# Patient Record
Sex: FEMALE | Race: WHITE
[De-identification: names, ages, dates, MRNs, and addresses within clinical notes are randomized per-mention and may not be internally consistent; named-entity substitution may affect disease eponyms.]

---

## 2019-08-24 ENCOUNTER — HOSPITAL ENCOUNTER (INPATIENT)
Dept: HOSPITAL 61 - ER | Age: 24
LOS: 1 days | Discharge: HOME | DRG: 418 | End: 2019-08-25
Attending: FAMILY MEDICINE | Admitting: FAMILY MEDICINE
Payer: SELF-PAY

## 2019-08-24 VITALS — BODY MASS INDEX: 37.38 KG/M2 | WEIGHT: 267 LBS | HEIGHT: 71 IN

## 2019-08-24 DIAGNOSIS — K80.10: Primary | ICD-10-CM

## 2019-08-24 DIAGNOSIS — J45.909: ICD-10-CM

## 2019-08-24 DIAGNOSIS — E66.01: ICD-10-CM

## 2019-08-24 DIAGNOSIS — F98.8: ICD-10-CM

## 2019-08-24 DIAGNOSIS — F32.9: ICD-10-CM

## 2019-08-24 DIAGNOSIS — Z84.89: ICD-10-CM

## 2019-08-24 DIAGNOSIS — F17.210: ICD-10-CM

## 2019-08-24 DIAGNOSIS — N39.0: ICD-10-CM

## 2019-08-24 DIAGNOSIS — F41.9: ICD-10-CM

## 2019-08-24 DIAGNOSIS — I10: ICD-10-CM

## 2019-08-24 DIAGNOSIS — Z88.0: ICD-10-CM

## 2019-08-24 DIAGNOSIS — G40.909: ICD-10-CM

## 2019-08-24 LAB
ALBUMIN SERPL-MCNC: 3.9 G/DL (ref 3.4–5)
ALBUMIN/GLOB SERPL: 1 {RATIO} (ref 1–1.7)
ALP SERPL-CCNC: 75 U/L (ref 46–116)
ALT SERPL-CCNC: 14 U/L (ref 14–59)
AMORPH SED URNS QL MICRO: PRESENT /HPF
ANION GAP SERPL CALC-SCNC: 9 MMOL/L (ref 6–14)
APTT PPP: YELLOW S
AST SERPL-CCNC: 12 U/L (ref 15–37)
BACTERIA #/AREA URNS HPF: (no result) /HPF
BASOPHILS # BLD AUTO: 0.1 X10^3/UL (ref 0–0.2)
BASOPHILS NFR BLD: 1 % (ref 0–3)
BILIRUB SERPL-MCNC: 0.2 MG/DL (ref 0.2–1)
BILIRUB UR QL STRIP: NEGATIVE
BUN SERPL-MCNC: 16 MG/DL (ref 7–20)
BUN/CREAT SERPL: 18 (ref 6–20)
CALCIUM SERPL-MCNC: 9.3 MG/DL (ref 8.5–10.1)
CHLORIDE SERPL-SCNC: 105 MMOL/L (ref 98–107)
CO2 SERPL-SCNC: 27 MMOL/L (ref 21–32)
CREAT SERPL-MCNC: 0.9 MG/DL (ref 0.6–1)
EOSINOPHIL NFR BLD: 0.2 X10^3/UL (ref 0–0.7)
EOSINOPHIL NFR BLD: 3 % (ref 0–3)
ERYTHROCYTE [DISTWIDTH] IN BLOOD BY AUTOMATED COUNT: 13.9 % (ref 11.5–14.5)
FIBRINOGEN PPP-MCNC: (no result) MG/DL
GFR SERPLBLD BASED ON 1.73 SQ M-ARVRAT: 78.3 ML/MIN
GLOBULIN SER-MCNC: 3.9 G/DL (ref 2.2–3.8)
GLUCOSE SERPL-MCNC: 141 MG/DL (ref 70–99)
HCT VFR BLD CALC: 40.7 % (ref 36–47)
HGB BLD-MCNC: 13.7 G/DL (ref 12–15.5)
LIPASE: 129 U/L (ref 73–393)
LYMPHOCYTES # BLD: 2 X10^3/UL (ref 1–4.8)
LYMPHOCYTES NFR BLD AUTO: 22 % (ref 24–48)
MCH RBC QN AUTO: 30 PG (ref 25–35)
MCHC RBC AUTO-ENTMCNC: 34 G/DL (ref 31–37)
MCV RBC AUTO: 89 FL (ref 79–100)
MONO #: 0.6 X10^3/UL (ref 0–1.1)
MONOCYTES NFR BLD: 7 % (ref 0–9)
NEUT #: 6.3 X10^3/UL (ref 1.8–7.7)
NEUTROPHILS NFR BLD AUTO: 69 % (ref 31–73)
NITRITE UR QL STRIP: NEGATIVE
PH UR STRIP: 6.5 [PH]
PLATELET # BLD AUTO: 238 X10^3/UL (ref 140–400)
POTASSIUM SERPL-SCNC: 4 MMOL/L (ref 3.5–5.1)
PROT SERPL-MCNC: 7.8 G/DL (ref 6.4–8.2)
PROT UR STRIP-MCNC: NEGATIVE MG/DL
RBC # BLD AUTO: 4.6 X10^6/UL (ref 3.5–5.4)
RBC #/AREA URNS HPF: (no result) /HPF (ref 0–2)
SODIUM SERPL-SCNC: 141 MMOL/L (ref 136–145)
SQUAMOUS #/AREA URNS LPF: (no result) /LPF
T VAGINALIS URNS QL MICRO: PRESENT
UROBILINOGEN UR-MCNC: 0.2 MG/DL
WBC # BLD AUTO: 9.1 X10^3/UL (ref 4–11)
WBC #/AREA URNS HPF: (no result) /HPF (ref 0–4)

## 2019-08-24 PROCEDURE — 81025 URINE PREGNANCY TEST: CPT

## 2019-08-24 PROCEDURE — 74300 X-RAY BILE DUCTS/PANCREAS: CPT

## 2019-08-24 PROCEDURE — A7015 AEROSOL MASK USED W NEBULIZE: HCPCS

## 2019-08-24 PROCEDURE — 83690 ASSAY OF LIPASE: CPT

## 2019-08-24 PROCEDURE — 81001 URINALYSIS AUTO W/SCOPE: CPT

## 2019-08-24 PROCEDURE — G0378 HOSPITAL OBSERVATION PER HR: HCPCS

## 2019-08-24 PROCEDURE — 88304 TISSUE EXAM BY PATHOLOGIST: CPT

## 2019-08-24 PROCEDURE — 85025 COMPLETE CBC W/AUTO DIFF WBC: CPT

## 2019-08-24 PROCEDURE — 36415 COLL VENOUS BLD VENIPUNCTURE: CPT

## 2019-08-24 PROCEDURE — 87086 URINE CULTURE/COLONY COUNT: CPT

## 2019-08-24 PROCEDURE — 80053 COMPREHEN METABOLIC PANEL: CPT

## 2019-08-24 PROCEDURE — 76705 ECHO EXAM OF ABDOMEN: CPT

## 2019-08-24 RX ADMIN — MORPHINE SULFATE PRN MG: 4 INJECTION, SOLUTION INTRAMUSCULAR; INTRAVENOUS at 23:05

## 2019-08-24 NOTE — RAD
Examination: Ultrasound abdomen limited

 

HISTORY: History of right upper quadrant pain, fever

 

COMPARISON: 9/17/2015

 

FINDINGS:

 

The pancreas is not well-visualized. The echogenicity liver grossly 

demonstrates mild increased echogenicity. The common bile duct measures 6 

mm in diameter. The right lobe of the liver measures 18.3 cm.

 

The right kidney measures 11.3 cm in length.

 

Few echogenicities identified within the gallbladder likely 

cholelithiasis. Examination is positive for ultrasonographic evidence of 

Church's sign. The gallbladder wall thickness measures 3 mm. The IVC is 

within normal limits of dimension.

 

 

IMPRESSION:

 

1. Cholelithiasis. Examination is positive for ultrasonographic evidence 

of Church's sign. The gallbladder wall thickness is upper limits of 

normal. Cholecystitis is not completely excluded but no evidence of 

pericholecystic fluid identified. Correlate clinically.

 

2. Hepatic steatosis with mild hepatomegaly.

 

2. The common bile duct measures 6 mm in diameter is upper limits of 

normal. . Correlate with lab values.

 

Electronically signed by: Mane Bailey MD (8/24/2019 9:47 PM) [facility]

## 2019-08-24 NOTE — PHYS DOC
Past Medical History


Past Medical History:  Anemia, Anxiety, Asthma, Depression, Hypertension, 

Seizure


Past Surgical History:  Other


Additional Past Surgical Histo:  MOUTH AND BACK, RIGHT KNEE


Alcohol Use:  None


Drug Use:  None





Adult General


Chief Complaint


Chief Complaint:  ABDOMINAL PAIN





HPI


HPI





Patient is a 24  year old f p/w abdo pain


ruq since six am


fever 101


mom concerned about gb





dull constant sharp worsened with time radiates to flank


no prior surgeries


hx of epilepsy not on any meds





Review of Systems


Review of Systems





Constitutional: 


Eyes: Denies change in visual acuity, redness, or eye pain []


HENT: Denies nasal congestion or sore throat []


Respiratory: Denies cough or shortness of breath []


Cardiovascular: No additional information not addressed in HPI []


GI: 


Musculoskeletal: Denies back pain or joint pain []


Integument: Denies rash or skin lesions []


Neurologic: Denies headache, focal weakness or sensory changes []








All other systems were reviewed and found to be within normal limits, except as 

documented in this note.





Current Medications


Current Medications





Current Medications








 Medications


  (Trade)  Dose


 Ordered  Sig/Guille  Start Time


 Stop Time Status Last Admin


Dose Admin


 


 Ciprofloxacin/


 Dextrose  200 ml @ 


 200 mls/hr  1X  ONCE  8/24/19 22:15


 8/24/19 23:14   





 


 Fentanyl Citrate


  (Fentanyl 2ml


 Vial)  100 mcg  STK-MED ONCE  8/24/19 20:27


 8/24/19 20:28 DC  





 


 Metronidazole  100 ml @ 


 100 mls/hr  1X  ONCE  8/24/19 22:15


 8/24/19 23:14 UNV  





 


 Morphine Sulfate


  (Morphine


 Sulfate)  4 mg  PRN Q2HR  PRN  8/24/19 22:15


 8/25/19 22:14 UNV  





 


 Ondansetron HCl


  (Zofran)  4 mg  PRN Q8HRS  PRN  8/24/19 22:15


 8/25/19 22:14   





 


 Sodium Chloride  1,000 ml @ 


 100 mls/hr  Q10H  8/24/19 22:15


 8/25/19 22:14   














Allergies


Allergies





Allergies








Coded Allergies Type Severity Reaction Last Updated Verified


 


  Penicillins Allergy Severe  8/24/19 Yes











Physical Exam


Physical Exam





Constitutional: Well developed, well nourished, no acute distress, non-toxic 

appearance. []


HENT: Normocephalic, atraumatic, bilateral external ears normal, oropharynx 

moist, no oral exudates, nose normal. []


Eyes: PERRLA, EOMI, conjunctiva normal, no discharge. [] 


Neck: Normal range of motion, no tenderness, supple, no stridor. [] 


Cardiovascular:Heart rate regular rhythm, no murmur []


Lungs & Thorax:  Bilateral breath sounds clear to auscultation []


Abdomen: Bowel sounds normal, soft, ruq tenderness, no masses, no pulsatile 

masses. [] 


Skin: Warm, dry, no erythema, no rash. [] 


Back: No tenderness, no CVA tenderness. [] 


Extremities: No tenderness, no cyanosis, no clubbing, ROM intact, no edema. [] 


Neurologic: Alert and oriented X 3, normal motor function, normal sensory 

function, no focal deficits noted. []


Psychologic: Affect normal, judgement normal, mood normal. []





Current Patient Data


Vital Signs





                                   Vital Signs








  Date Time  Temp Pulse Resp B/P (MAP) Pulse Ox O2 Delivery O2 Flow Rate FiO2


 


8/24/19 21:08  79 16 116/64 (81) 99 Room Air  


 


8/24/19 20:00 97.7       





 97.7       








Lab Values





                                Laboratory Tests








Test


 8/24/19


20:00 8/24/19


20:11 8/24/19


20:22


 


Urine Collection Type Unknown    


 


Urine Color Yellow    


 


Urine Clarity Cloudy    


 


Urine pH 6.5    


 


Urine Specific Gravity 1.025    


 


Urine Protein


 Negative mg/dL


(NEG-TRACE) 


 





 


Urine Glucose (UA)


 Negative mg/dL


(NEG) 


 





 


Urine Ketones (Stick)


 Negative mg/dL


(NEG) 


 





 


Urine Blood


 Negative (NEG)


 


 





 


Urine Nitrite


 Negative (NEG)


 


 





 


Urine Bilirubin


 Negative (NEG)


 


 





 


Urine Urobilinogen Dipstick


 0.2 mg/dL (0.2


mg/dL) 


 





 


Urine Leukocyte Esterase


 Moderate (NEG)


 


 





 


Urine RBC


 1-2 /HPF (0-2)


 


 





 


Urine WBC


 5-10 /HPF


(0-4) 


 





 


Urine Squamous Epithelial


Cells Mod /LPF  


 


 





 


Urine Amorphous Sediment Present /HPF    


 


Urine Bacteria


 Moderate /HPF


(0-FEW) 


 





 


Urine Trichomonas Present    


 


POC Urine HCG, Qualitative


 


 Hcg negative


(Negative) 





 


White Blood Count


 


 


 9.1 x10^3/uL


(4.0-11.0)


 


Red Blood Count


 


 


 4.60 x10^6/uL


(3.50-5.40)


 


Hemoglobin


 


 


 13.7 g/dL


(12.0-15.5)


 


Hematocrit


 


 


 40.7 %


(36.0-47.0)


 


Mean Corpuscular Volume


 


 


 89 fL ()





 


Mean Corpuscular Hemoglobin   30 pg (25-35)  


 


Mean Corpuscular Hemoglobin


Concent 


 


 34 g/dL


(31-37)


 


Red Cell Distribution Width


 


 


 13.9 %


(11.5-14.5)


 


Platelet Count


 


 


 238 x10^3/uL


(140-400)


 


Neutrophils (%) (Auto)   69 % (31-73)  


 


Lymphocytes (%) (Auto)   22 % (24-48)  L


 


Monocytes (%) (Auto)   7 % (0-9)  


 


Eosinophils (%) (Auto)   3 % (0-3)  


 


Basophils (%) (Auto)   1 % (0-3)  


 


Neutrophils # (Auto)


 


 


 6.3 x10^3/uL


(1.8-7.7)


 


Lymphocytes # (Auto)


 


 


 2.0 x10^3/uL


(1.0-4.8)


 


Monocytes # (Auto)


 


 


 0.6 x10^3/uL


(0.0-1.1)


 


Eosinophils # (Auto)


 


 


 0.2 x10^3/uL


(0.0-0.7)


 


Basophils # (Auto)


 


 


 0.1 x10^3/uL


(0.0-0.2)


 


Sodium Level


 


 


 141 mmol/L


(136-145)


 


Potassium Level


 


 


 4.0 mmol/L


(3.5-5.1)


 


Chloride Level


 


 


 105 mmol/L


()


 


Carbon Dioxide Level


 


 


 27 mmol/L


(21-32)


 


Anion Gap   9 (6-14)  


 


Blood Urea Nitrogen


 


 


 16 mg/dL


(7-20)


 


Creatinine


 


 


 0.9 mg/dL


(0.6-1.0)


 


Estimated GFR


(Cockcroft-Gault) 


 


 78.3  





 


BUN/Creatinine Ratio   18 (6-20)  


 


Glucose Level


 


 


 141 mg/dL


(70-99)  H


 


Calcium Level


 


 


 9.3 mg/dL


(8.5-10.1)


 


Total Bilirubin


 


 


 0.2 mg/dL


(0.2-1.0)


 


Aspartate Amino Transferase


(AST) 


 


 12 U/L (15-37)


L


 


Alanine Aminotransferase (ALT)


 


 


 14 U/L (14-59)





 


Alkaline Phosphatase


 


 


 75 U/L


()


 


Total Protein


 


 


 7.8 g/dL


(6.4-8.2)


 


Albumin


 


 


 3.9 g/dL


(3.4-5.0)


 


Albumin/Globulin Ratio   1.0 (1.0-1.7)  


 


Lipase


 


 


 129 U/L


()





                                Laboratory Tests


8/24/19 20:22








                                Laboratory Tests


8/24/19 20:22














EKG


EKG


[]





Radiology/Procedures


Radiology/Procedures


[]


Impressions:


MPRESSION:


 


1. Cholelithiasis. Examination is positive for ultrasonographic evidence 


of Churhc's sign. The gallbladder wall thickness is upper limits of 


normal. Cholecystitis is not completely excluded but no evidence of 


pericholecystic fluid identified. Correlate clinically.


 


2. Hepatic steatosis with mild hepatomegaly.


 


2. The common bile duct measures 6 mm in diameter is upper limits of 


normal. . Correlate with lab values.


 


Electronically signed by: Mane Bailey MD (8/24/2019 9:47 PM) [facility]





Course & Med Decision Making


Course & Med Decision Making


Pertinent Labs and Imaging studies reviewed. (See chart for details)





[]SUSPECT EARLY CHOLECYSTITIS


STILL HAVING PAIN AFTER FENTANYL





BORDERLINE U/S


CONSULT WTIH CAGE





ADMIT FULBRIGHT


CIPRO/FLAGYL (PT DESCRIBES SOB WITH PCN)








THERE IS NO RLQ TTP AT ALL ON TWO EXAMS IN ED





Dragon Disclaimer


Dragon Disclaimer


This electronic medical record was generated, in whole or in part, using a voice

 recognition dictation system.





Departure


Departure


Impression:  


   Primary Impression:  


   Abdominal pain


Disposition:  09 ADMITTED AS INPATIENT


Admitting Physician:  HIMS


Referrals:  


NO PCP (PCP)











SVETLANA PORTILLO MD            Aug 24, 2019 20:41

## 2019-08-25 VITALS — SYSTOLIC BLOOD PRESSURE: 111 MMHG | DIASTOLIC BLOOD PRESSURE: 75 MMHG

## 2019-08-25 VITALS — DIASTOLIC BLOOD PRESSURE: 77 MMHG | SYSTOLIC BLOOD PRESSURE: 115 MMHG

## 2019-08-25 VITALS — SYSTOLIC BLOOD PRESSURE: 108 MMHG | DIASTOLIC BLOOD PRESSURE: 56 MMHG

## 2019-08-25 VITALS — DIASTOLIC BLOOD PRESSURE: 60 MMHG | SYSTOLIC BLOOD PRESSURE: 107 MMHG

## 2019-08-25 VITALS — SYSTOLIC BLOOD PRESSURE: 128 MMHG | DIASTOLIC BLOOD PRESSURE: 72 MMHG

## 2019-08-25 PROCEDURE — BF101ZZ FLUOROSCOPY OF BILE DUCTS USING LOW OSMOLAR CONTRAST: ICD-10-PCS | Performed by: SURGERY

## 2019-08-25 PROCEDURE — 0FT44ZZ RESECTION OF GALLBLADDER, PERCUTANEOUS ENDOSCOPIC APPROACH: ICD-10-PCS | Performed by: SURGERY

## 2019-08-25 RX ADMIN — BACITRACIN SCH MLS/HR: 5000 INJECTION, POWDER, FOR SOLUTION INTRAMUSCULAR at 00:42

## 2019-08-25 RX ADMIN — BACITRACIN SCH MLS/HR: 5000 INJECTION, POWDER, FOR SOLUTION INTRAMUSCULAR at 08:15

## 2019-08-25 RX ADMIN — PROCHLORPERAZINE EDISYLATE PRN MG: 5 INJECTION INTRAMUSCULAR; INTRAVENOUS at 11:19

## 2019-08-25 RX ADMIN — MORPHINE SULFATE PRN MG: 4 INJECTION, SOLUTION INTRAMUSCULAR; INTRAVENOUS at 07:34

## 2019-08-25 RX ADMIN — FENTANYL CITRATE PRN MCG: 50 INJECTION INTRAMUSCULAR; INTRAVENOUS at 11:29

## 2019-08-25 RX ADMIN — FENTANYL CITRATE PRN MCG: 50 INJECTION INTRAMUSCULAR; INTRAVENOUS at 11:20

## 2019-08-25 RX ADMIN — PROCHLORPERAZINE EDISYLATE PRN MG: 5 INJECTION INTRAMUSCULAR; INTRAVENOUS at 11:31

## 2019-08-25 RX ADMIN — MORPHINE SULFATE PRN MG: 4 INJECTION, SOLUTION INTRAMUSCULAR; INTRAVENOUS at 13:02

## 2019-08-25 NOTE — PDOC2
CONSULT


Date of Consult


Date of Consult


DATE: 8/25/19 


TIME: 08:15





History of Present Illness


Reason for Visit:


The patient is a 24 year old female who reported to the ER with abdominal pain. 

The pain was initially in the mid abdomen but then worsened and localized to the

RUQ and back.  The pain began yesterday and was sharp and severe.  She had 

associated nausea and reports a temperature of 101.  She had a similar episode 

of this pain a few months back and was seen in [facility].  Her evaluation here 

is consistent with cholecystitis.





Past Medical History


Past Medical History


seizures,  ADD, depression, obesity





Past Surgical History


Past Surgical History


dental surgery, D and C





Social History


1 pack per day


ALCOHOL:  rare





Current Problem List


Problem List


Problems


Medical Problems:


(1) Abdominal pain


Status: Acute  











Current Medications


Current Medications





Current Medications


Sodium Chloride 1,000 ml @  1,000 mls/hr 1X  ONCE IV  Last administered on 

8/24/19at 20:30;  Start 8/24/19 at 20:30;  Stop 8/24/19 at 21:29;  Status DC


Fentanyl Citrate (Fentanyl 2ml Vial) 50 mcg 1X  ONCE IV  Last administered on 

8/24/19at 20:30;  Start 8/24/19 at 20:30;  Stop 8/24/19 at 20:31;  Status DC


Ondansetron HCl (Zofran) 4 mg 1X  ONCE IV  Last administered on 8/24/19at 20:30;

 Start 8/24/19 at 20:30;  Stop 8/24/19 at 20:31;  Status DC


Ondansetron HCl (Zofran) 4 mg STK-MED ONCE .ROUTE ;  Start 8/24/19 at 20:27;  

Stop 8/24/19 at 20:27;  Status DC


Fentanyl Citrate (Fentanyl 2ml Vial) 100 mcg STK-MED ONCE .ROUTE ;  Start 

8/24/19 at 20:27;  Stop 8/24/19 at 20:28;  Status DC


Ondansetron HCl (Zofran) 4 mg PRN Q8HRS  PRN IV NAUSEA/VOMITING Last 

administered on 8/25/19at 07:38;  Start 8/24/19 at 22:15;  Stop 8/25/19 at 22:14


Morphine Sulfate (Morphine Sulfate) 4 mg PRN Q2HR  PRN IV PAIN Last administered

on 8/25/19at 07:38;  Start 8/24/19 at 22:15;  Stop 8/25/19 at 22:14


Sodium Chloride 1,000 ml @  100 mls/hr Q10H IV  Last administered on 8/25/19at 

00:42;  Start 8/24/19 at 22:15;  Stop 8/25/19 at 22:14


Ciprofloxacin/ Dextrose 200 ml @  200 mls/hr 1X  ONCE IV  Last administered on 

8/24/19at 23:50;  Start 8/24/19 at 22:15;  Stop 8/24/19 at 23:14;  Status DC


Metronidazole 100 ml @  100 mls/hr 1X  ONCE IV  Last administered on 8/25/19at 

00:42;  Start 8/24/19 at 22:15;  Stop 8/24/19 at 23:14;  Status DC


Nicotine (Nicoderm Cq 21mg) 1 patch DAILY TD  Last administered on 8/25/19at 

01:39;  Start 8/25/19 at 01:30


Lorazepam (Ativan Inj) 1 mg PRN Q6HRS  PRN IV ANXIETY / AGITATION Last 

administered on 8/25/19at 01:39;  Start 8/25/19 at 01:30


Iohexol (Omnipaque 300 Mg/ml) 50 ml STK-MED ONCE .ROUTE ;  Start 8/25/19 at 

07:46;  Stop 8/25/19 at 07:46;  Status DC


Cellulose (Surgicel Hemostat 4x8) 1 each STK-MED ONCE .ROUTE ;  Start 8/25/19 at

07:46;  Stop 8/25/19 at 07:46;  Status DC


Bupivacaine HCl (Sensorcaine Mpf 0.5%) 30 ml STK-MED ONCE .ROUTE ;  Start 

8/25/19 at 07:46;  Stop 8/25/19 at 07:46;  Status DC


Ondansetron HCl (Zofran) 4 mg PRN Q6HRS  PRN IV NAUSEA/VOMITING;  Start 8/25/19 

at 08:00;  Stop 8/25/19 at 18:00


Fentanyl Citrate (Fentanyl 2ml Vial) 25 mcg PRN Q5MIN  PRN IV MILD PAIN 1-3;  

Start 8/25/19 at 08:00;  Stop 8/25/19 at 18:00


Fentanyl Citrate (Fentanyl 2ml Vial) 50 mcg PRN Q5MIN  PRN IV MODERATE TO SEVERE

PAIN;  Start 8/25/19 at 08:00;  Stop 8/25/19 at 18:00


Morphine Sulfate (Morphine Sulfate) 1 mg PRN Q10MIN  PRN IV SEVERE PAIN 7-10;  

Start 8/25/19 at 08:00;  Stop 8/25/19 at 18:00


Ringer's Solution 1,000 ml @  30 mls/hr Q24H IV ;  Start 8/25/19 at 07:50;  Stop

8/25/19 at 19:49


Hydromorphone HCl (Dilaudid) 0.5 mg PRN Q10MIN  PRN IV SEV PAIN, Second choice; 

Start 8/25/19 at 08:00;  Stop 8/25/19 at 18:00


Prochlorperazine Edisylate (Compazine) 5 mg PACU PRN  PRN IV NAUSEA, MRX1;  

Start 8/25/19 at 08:00;  Stop 8/25/19 at 18:00





Active Scripts


Active


Zofran (Ondansetron Hcl) 4 Mg Tablet 1 Tab PO Q6HRS


Zofran Odt (Ondansetron) 4 Mg Tab.rapdis 4 Mg PO Q8HRS PRN


Clindamycin Hcl 150 Mg Capsule 300 Mg PO QID 10 Days





Allergies


Allergies:  


Coded Allergies:  


     Penicillins (Verified  Allergy, Severe, 8/24/19)


   


   difficulty breathing





ROS


General:  YES: Other (fever)


PSYCHOLOGICAL ROS:  YES: Anxiety


Eyes:  No Blurry vision, No Decreased vision, No Double vision, No Dry eyes, No 

Excessive tearing, No Eye Pain, No Itchy Eyes, No Loss of vision, No 

Photophobia, No Scotomata, No Uses contacts, No Uses glasses, No Other


HEENT:  No: Heacaches, Visual Changes, Hearing change, Nasal congestion, Nasal 

discharge, Oral lesions, Sinus pain, Sore Throat, Epistaxis, Sneezing, Snoring, 

Tinnitus, Vertigo, Vocal changes, Other


ALLERGY AND IMMUNOLOGY:  No: Hives, Insect Bite Sensitivity, Itchy/Watery Eyes, 

Nasal Congestion, Post Nasal Drip, Seasonal Allergies, Other


Hematological and Lymphatic:  No: Bleeding Problems, Blood Clots, Blood 

Transfusions, Brusing, Night Sweats, Pallor, Swollen Lymph Nodes, Other


ENDOCRINE:  No: Breast Changes, Galactorrhea, Hair Pattern Changes, Hot Flashes,

Malaise/lethargy, Mood Swings, Palpitations, Polydipsia/polyuria, Skin Changes, 

Temperature Intolerance, Unexpected Weight Changes, Other


Respiratory:  No: Cough, Hemoptysis, Orthopnea, Pleuritic Pain, Shortness of 

breath, SOB with excertion, Sputum Changes, Stridor, Tachypnea, Wheezing, Other


Cardiovascular:  No Chest Pain, No Palpitations, No Orthopnea, No Paroxysmal 

Noc. Dyspnea, No Edema, No Lt Headedness, No Other


Gastrointestinal:  Yes Nausea, Yes Abdominal Pain


Genitourinary:  No Dysuria, No Frequency, No Incontinence, No Hematuria, No 

Retention, No Discharge, No Urgency, No Pain, No Flank Pain, No Other, No , No ,

No , No , No , No , No 


Musculoskeletal:  No Gait Disturbance, No Joint Pain, No Joint Stiffness, No 

Joint Swelling, No Muscle Pain, No Muscular Weakness, No Pain In:, No Swelling 

In:, No Other


Neurological:  No Behavorial Changes, No Bowel/Bladder ControlChng, No 

Confusion, No Dizziness, No Gait Disturbance, No Headaches, No Impaired 

Coord/balance, No Memory Loss, No Numbness/Tingling, No Seizures, No Speech 

Problems, No Tremors, No Visual Changes, No Weakness, No Other


Skin:  No Dry Skin, No Eczema, No Hair Changes, No Lumps, No Mole Changes, No 

Mottling, No Nail Changes, No Pruritus, No Rash, No Skin Lesion Changes, No 

Other, No Acne





Physical Exam


General:  Alert, Oriented X3, Cooperative


HEENT:  Atraumatic


Lungs:  Clear to auscultation


Abdomen:  Soft (obese, tender with palpation in RUQ)


Extremities:  No clubbing, No cyanosis


Skin:  No rashes, No breakdown


Neuro:  Normal speech


Psych/Mental Status:  Mental status NL





Vitals


VITALS





Vital Signs








  Date Time  Temp Pulse Resp B/P (MAP) Pulse Ox O2 Delivery O2 Flow Rate FiO2


 


8/25/19 07:38      Room Air  


 


8/25/19 03:29 98.3 72 20 128/72 (90) 99   





 98.3       











Labs


Labs





Laboratory Tests








Test


 8/24/19


20:00 8/24/19


20:11 8/24/19


20:22


 


Urine Collection Type Unknown   


 


Urine Color Yellow   


 


Urine Clarity Cloudy   


 


Urine pH 6.5   


 


Urine Specific Gravity 1.025   


 


Urine Protein


 Negative mg/dL


(NEG-TRACE) 


 





 


Urine Glucose (UA)


 Negative mg/dL


(NEG) 


 





 


Urine Ketones (Stick)


 Negative mg/dL


(NEG) 


 





 


Urine Blood Negative (NEG)   


 


Urine Nitrite Negative (NEG)   


 


Urine Bilirubin Negative (NEG)   


 


Urine Urobilinogen Dipstick


 0.2 mg/dL (0.2


mg/dL) 


 





 


Urine Leukocyte Esterase Moderate (NEG)   


 


Urine RBC 1-2 /HPF (0-2)   


 


Urine WBC


 5-10 /HPF


(0-4) 


 





 


Urine Squamous Epithelial


Cells Mod /LPF 


 


 





 


Urine Amorphous Sediment Present /HPF   


 


Urine Bacteria


 Moderate /HPF


(0-FEW) 


 





 


Urine Trichomonas Present   


 


Bedside Urine HCG, Qualitative


 


 Hcg negative


(Negative) 





 


White Blood Count


 


 


 9.1 x10^3/uL


(4.0-11.0)


 


Red Blood Count


 


 


 4.60 x10^6/uL


(3.50-5.40)


 


Hemoglobin


 


 


 13.7 g/dL


(12.0-15.5)


 


Hematocrit


 


 


 40.7 %


(36.0-47.0)


 


Mean Corpuscular Volume   89 fL () 


 


Mean Corpuscular Hemoglobin   30 pg (25-35) 


 


Mean Corpuscular Hemoglobin


Concent 


 


 34 g/dL


(31-37)


 


Red Cell Distribution Width


 


 


 13.9 %


(11.5-14.5)


 


Platelet Count


 


 


 238 x10^3/uL


(140-400)


 


Neutrophils (%) (Auto)   69 % (31-73) 


 


Lymphocytes (%) (Auto)   22 % (24-48) 


 


Monocytes (%) (Auto)   7 % (0-9) 


 


Eosinophils (%) (Auto)   3 % (0-3) 


 


Basophils (%) (Auto)   1 % (0-3) 


 


Neutrophils # (Auto)


 


 


 6.3 x10^3/uL


(1.8-7.7)


 


Lymphocytes # (Auto)


 


 


 2.0 x10^3/uL


(1.0-4.8)


 


Monocytes # (Auto)


 


 


 0.6 x10^3/uL


(0.0-1.1)


 


Eosinophils # (Auto)


 


 


 0.2 x10^3/uL


(0.0-0.7)


 


Basophils # (Auto)


 


 


 0.1 x10^3/uL


(0.0-0.2)


 


Sodium Level


 


 


 141 mmol/L


(136-145)


 


Potassium Level


 


 


 4.0 mmol/L


(3.5-5.1)


 


Chloride Level


 


 


 105 mmol/L


()


 


Carbon Dioxide Level


 


 


 27 mmol/L


(21-32)


 


Anion Gap   9 (6-14) 


 


Blood Urea Nitrogen


 


 


 16 mg/dL


(7-20)


 


Creatinine


 


 


 0.9 mg/dL


(0.6-1.0)


 


Estimated GFR


(Cockcroft-Gault) 


 


 78.3 





 


BUN/Creatinine Ratio   18 (6-20) 


 


Glucose Level


 


 


 141 mg/dL


(70-99)


 


Calcium Level


 


 


 9.3 mg/dL


(8.5-10.1)


 


Total Bilirubin


 


 


 0.2 mg/dL


(0.2-1.0)


 


Aspartate Amino Transf


(AST/SGOT) 


 


 12 U/L (15-37) 





 


Alanine Aminotransferase


(ALT/SGPT) 


 


 14 U/L (14-59) 





 


Alkaline Phosphatase


 


 


 75 U/L


()


 


Total Protein


 


 


 7.8 g/dL


(6.4-8.2)


 


Albumin


 


 


 3.9 g/dL


(3.4-5.0)


 


Albumin/Globulin Ratio   1.0 (1.0-1.7) 


 


Lipase


 


 


 129 U/L


()








Laboratory Tests








Test


 8/24/19


20:00 8/24/19


20:11 8/24/19


20:22


 


Urine Collection Type Unknown   


 


Urine Color Yellow   


 


Urine Clarity Cloudy   


 


Urine pH 6.5   


 


Urine Specific Gravity 1.025   


 


Urine Protein


 Negative mg/dL


(NEG-TRACE) 


 





 


Urine Glucose (UA)


 Negative mg/dL


(NEG) 


 





 


Urine Ketones (Stick)


 Negative mg/dL


(NEG) 


 





 


Urine Blood Negative (NEG)   


 


Urine Nitrite Negative (NEG)   


 


Urine Bilirubin Negative (NEG)   


 


Urine Urobilinogen Dipstick


 0.2 mg/dL (0.2


mg/dL) 


 





 


Urine Leukocyte Esterase Moderate (NEG)   


 


Urine RBC 1-2 /HPF (0-2)   


 


Urine WBC


 5-10 /HPF


(0-4) 


 





 


Urine Squamous Epithelial


Cells Mod /LPF 


 


 





 


Urine Amorphous Sediment Present /HPF   


 


Urine Bacteria


 Moderate /HPF


(0-FEW) 


 





 


Urine Trichomonas Present   


 


Bedside Urine HCG, Qualitative


 


 Hcg negative


(Negative) 





 


White Blood Count


 


 


 9.1 x10^3/uL


(4.0-11.0)


 


Red Blood Count


 


 


 4.60 x10^6/uL


(3.50-5.40)


 


Hemoglobin


 


 


 13.7 g/dL


(12.0-15.5)


 


Hematocrit


 


 


 40.7 %


(36.0-47.0)


 


Mean Corpuscular Volume   89 fL () 


 


Mean Corpuscular Hemoglobin   30 pg (25-35) 


 


Mean Corpuscular Hemoglobin


Concent 


 


 34 g/dL


(31-37)


 


Red Cell Distribution Width


 


 


 13.9 %


(11.5-14.5)


 


Platelet Count


 


 


 238 x10^3/uL


(140-400)


 


Neutrophils (%) (Auto)   69 % (31-73) 


 


Lymphocytes (%) (Auto)   22 % (24-48) 


 


Monocytes (%) (Auto)   7 % (0-9) 


 


Eosinophils (%) (Auto)   3 % (0-3) 


 


Basophils (%) (Auto)   1 % (0-3) 


 


Neutrophils # (Auto)


 


 


 6.3 x10^3/uL


(1.8-7.7)


 


Lymphocytes # (Auto)


 


 


 2.0 x10^3/uL


(1.0-4.8)


 


Monocytes # (Auto)


 


 


 0.6 x10^3/uL


(0.0-1.1)


 


Eosinophils # (Auto)


 


 


 0.2 x10^3/uL


(0.0-0.7)


 


Basophils # (Auto)


 


 


 0.1 x10^3/uL


(0.0-0.2)


 


Sodium Level


 


 


 141 mmol/L


(136-145)


 


Potassium Level


 


 


 4.0 mmol/L


(3.5-5.1)


 


Chloride Level


 


 


 105 mmol/L


()


 


Carbon Dioxide Level


 


 


 27 mmol/L


(21-32)


 


Anion Gap   9 (6-14) 


 


Blood Urea Nitrogen


 


 


 16 mg/dL


(7-20)


 


Creatinine


 


 


 0.9 mg/dL


(0.6-1.0)


 


Estimated GFR


(Cockcroft-Gault) 


 


 78.3 





 


BUN/Creatinine Ratio   18 (6-20) 


 


Glucose Level


 


 


 141 mg/dL


(70-99)


 


Calcium Level


 


 


 9.3 mg/dL


(8.5-10.1)


 


Total Bilirubin


 


 


 0.2 mg/dL


(0.2-1.0)


 


Aspartate Amino Transf


(AST/SGOT) 


 


 12 U/L (15-37) 





 


Alanine Aminotransferase


(ALT/SGPT) 


 


 14 U/L (14-59) 





 


Alkaline Phosphatase


 


 


 75 U/L


()


 


Total Protein


 


 


 7.8 g/dL


(6.4-8.2)


 


Albumin


 


 


 3.9 g/dL


(3.4-5.0)


 


Albumin/Globulin Ratio   1.0 (1.0-1.7) 


 


Lipase


 


 


 129 U/L


()











Assessment/Plan


Assessment/Plan


RUQ pain, suspect calculous cholecystitis.  Recommend lap maryjane, the details and

risks of surgery were discussed.  She understands and would like to proceed.











LISS CAGE MD             Aug 25, 2019 08:19

## 2019-08-25 NOTE — RAD
Examination: Operative cholangiogram

 

History: Post cholecystectomy.

 

Fluoroscopic time was not given.

 

Procedure: Fluoroscopic images were provided during the procedure. The 

cystic duct has been catheterized and contrast has been injected.

 

Findings:  The common hepatic bile duct and common bile duct are patent 

without evidence of intraluminal filling defect or obstruction. Contrast 

empties normally into the duodenum.

 

Impression: Normal operative cholangiogram.

 

Electronically signed by: Mane Bailey MD (8/25/2019 11:02 AM) [facility]

## 2019-08-25 NOTE — NUR
Metronidozol vaginal gel 0.75% 1 application x 5 days called to [facility] on [address]. 
Pt. discharged to home, verbalized understanding of discharge instructions.  Lap sites x 5 
CDI. Pt. states she is fine with not having strong pain meds at discharge.

## 2019-08-25 NOTE — PDOC4
Operative Note


Operative Note


Operative Note:





Preoperative Diagnosis: Calculus cholecystitis 





Postoperative Diagnosis: Same





Procedure: Laparoscopic cholecystectomy with intraoperative cholangiogram





Surgeons: [place] [facility]





Anesthesia: Gen.





Estimated Blood Loss: 10 mL





Specimen: Gallbladder to pathology





Drains: None





Complications: None





Indications: The patient is a 24-year-old female who reported to the hospital 

due to severe right upper quadrant pain. Her evaluation is consistent with 

calculus cholecystitis.   Surgical treatment was offered by means of a 

laparoscopic cholecystectomy.  The risks of surgery were discussed which include

bleeding, infection, bile duct injury, bile leak, pain, the potential for 

additional surgeries or procedures.  The patient understands and would like to 

proceed.





Description:  The patient was taken to the operating room and laid supine on the

operating table.  General anesthesia was performed.  The abdomen was prepped 

with ChloraPrep and draped in a standard surgical fashion.  A small 

infraumbilical incision was made with a scalpel.  The Veress needle was then 

inserted and a pneumoperitoneum was then created.  A  5 mm trocar was then 

inserted and the laparoscope was introduced.  In the upper midabdomen a 5 mm 

trocar was inserted and in the right upper quadrant two 2.3 mm mini lap graspers

were inserted.  The gallbladder was retracted cephalad.  The cystic duct was 

dissected free from surrounding tissues.  One clip was placed on the duct near 

the gallbladder junction.  An opening was made in the duct and a 

cholangiocatheter placed within and secured with a clip.  Using contrast dye and

fluoroscopy an intraoperative cholangiogram was performed that appeared 

unremarkable.  The clip and catheter were then withdrawn.  Three clips were 

placed on the cystic duct and it was divided.  The cystic artery was then 

identified, dissected free, doubly clipped and divided as well.  The gallbladder

was then mobilized away from the liver with cautery.  The superior 5 millimeter 

trocar was exchanged for an 11 millimeter trocar.  The gallbladder was then 

placed in an endoscopic bag and extracted at the superior trocar site.  The 

fascia there was closed with an 0 Vicryl suture.  All blood and irrigation fluid

was suctioned and hemostasis was good.  The remaining ports were removed and the

pneumoperitoneum was relieved.  The skin incisions were injected with half 

percent Marcaine with epinephrine, and all were closed using 4-0 Monocryl 

suture.  Steri-Strips and dressings were then applied.  The patient tolerated 

the procedure well and was sent to the recovery room in stable condition.  At 

the end of the case all counts were correct.











LISS CAGE MD             Aug 25, 2019 10:58

## 2019-08-25 NOTE — NUR
Dr. Mead returned call, stated he would perfer that pt does not go tonight without a pain 
med Rx, but if she left to F/u in 2 weeks. Dr. Cueva paged. Pt. notified if she does 
leave there is a possibility of not having pain meds available.

## 2019-08-27 ENCOUNTER — HOSPITAL ENCOUNTER (EMERGENCY)
Dept: HOSPITAL 61 - ER | Age: 24
LOS: 1 days | Discharge: HOME | End: 2019-08-28
Payer: SELF-PAY

## 2019-08-27 VITALS — BODY MASS INDEX: 31.78 KG/M2 | WEIGHT: 222 LBS | HEIGHT: 70 IN

## 2019-08-27 DIAGNOSIS — Z88.0: ICD-10-CM

## 2019-08-27 DIAGNOSIS — G89.18: Primary | ICD-10-CM

## 2019-08-27 DIAGNOSIS — N39.0: ICD-10-CM

## 2019-08-27 DIAGNOSIS — J45.909: ICD-10-CM

## 2019-08-27 DIAGNOSIS — Z90.49: ICD-10-CM

## 2019-08-27 DIAGNOSIS — I10: ICD-10-CM

## 2019-08-27 DIAGNOSIS — R10.10: ICD-10-CM

## 2019-08-27 PROCEDURE — 87086 URINE CULTURE/COLONY COUNT: CPT

## 2019-08-27 PROCEDURE — 81001 URINALYSIS AUTO W/SCOPE: CPT

## 2019-08-27 PROCEDURE — 36415 COLL VENOUS BLD VENIPUNCTURE: CPT

## 2019-08-27 PROCEDURE — 96374 THER/PROPH/DIAG INJ IV PUSH: CPT

## 2019-08-27 PROCEDURE — 96375 TX/PRO/DX INJ NEW DRUG ADDON: CPT

## 2019-08-27 PROCEDURE — 83690 ASSAY OF LIPASE: CPT

## 2019-08-27 PROCEDURE — 85025 COMPLETE CBC W/AUTO DIFF WBC: CPT

## 2019-08-27 PROCEDURE — 80053 COMPREHEN METABOLIC PANEL: CPT

## 2019-08-27 PROCEDURE — 81025 URINE PREGNANCY TEST: CPT

## 2019-08-27 PROCEDURE — 99284 EMERGENCY DEPT VISIT MOD MDM: CPT

## 2019-08-27 NOTE — PATHOLOGY
[facility] Accession Number: [id number]

.                                                                01

Material submitted:                                        .

gallbladder - GALLBLADDER

.                                                                01

Clinical history:                                          .

Cholelithiasis

.                                                                02

**********************************************************************

Diagnosis:

Gallbladder, laparoscopic cholecystectomy:

- Cholelithiasis.

- Chronic cholecystitis with increased eosinophils.

(JPM:; 08/27/2019)

MBR/08/27/2019

**********************************************************************

.                                                                02

Comment:

There is no evidence of malignancy.

(JPM:; 08/27/2019)

.                                                                02

Electronically signed:                                     .

Eugene Casas MD, Pathologist

NPI- [id number]

.                                                                01

Gross description:                                         .

The specimen is received in formalin, labeled "Mervin, Mayra,

gallbladder", is intact, distended gallbladder measuring 8.0 cm in length

and 2.5 cm in maximum diameter with a smooth, glistening and predominantly

green serosa.  The cystic duct is patent.  The gallbladder lumen contains

dark green, viscous bile and four yellow bosselated calculi measuring

2.0 x 1.6 x 0.8 cm.  The mucosa is dark green with no discrete

cholesterolosis.  The wall is 0.1 cm in average thickness.

Representatively submitted in A1.  ([facility]; 8/26/2019)

SHS/SHS

.                                                                02

Pathologist provided ICD-10:

K80.10

.                                                                02

CPT                                                        .

[id number]

Specimen Comment: A courtesy copy of this report has been sent to

Specimen Comment: [phone], [phone], [phone].

Specimen Comment: Report sent to ,DR HOLLIS / DR PORTILLO

***Performed at:  01

   [address], [place], [place]  [id number]

   MD Mike Jarrell MD Phone:  [phone]

***Performed at:  02

   [address]  [id number]

   MD Eugene Casas MD Phone:  [phone]

## 2019-08-28 VITALS — SYSTOLIC BLOOD PRESSURE: 120 MMHG | DIASTOLIC BLOOD PRESSURE: 65 MMHG

## 2019-08-28 LAB
ALBUMIN SERPL-MCNC: 3.6 G/DL (ref 3.4–5)
ALBUMIN/GLOB SERPL: 1 {RATIO} (ref 1–1.7)
ALP SERPL-CCNC: 62 U/L (ref 46–116)
ALT SERPL-CCNC: 22 U/L (ref 14–59)
AMORPH SED URNS QL MICRO: PRESENT /HPF
ANION GAP SERPL CALC-SCNC: 11 MMOL/L (ref 6–14)
APTT PPP: YELLOW S
AST SERPL-CCNC: 13 U/L (ref 15–37)
BACTERIA #/AREA URNS HPF: (no result) /HPF
BASOPHILS # BLD AUTO: 0 X10^3/UL (ref 0–0.2)
BASOPHILS NFR BLD: 0 % (ref 0–3)
BILIRUB SERPL-MCNC: 0.1 MG/DL (ref 0.2–1)
BILIRUB UR QL STRIP: NEGATIVE
BUN SERPL-MCNC: 12 MG/DL (ref 7–20)
BUN/CREAT SERPL: 12 (ref 6–20)
CALCIUM SERPL-MCNC: 8.9 MG/DL (ref 8.5–10.1)
CHLORIDE SERPL-SCNC: 107 MMOL/L (ref 98–107)
CO2 SERPL-SCNC: 26 MMOL/L (ref 21–32)
CREAT SERPL-MCNC: 1 MG/DL (ref 0.6–1)
EOSINOPHIL NFR BLD: 0.3 X10^3/UL (ref 0–0.7)
EOSINOPHIL NFR BLD: 4 % (ref 0–3)
ERYTHROCYTE [DISTWIDTH] IN BLOOD BY AUTOMATED COUNT: 14.1 % (ref 11.5–14.5)
FIBRINOGEN PPP-MCNC: (no result) MG/DL
GFR SERPLBLD BASED ON 1.73 SQ M-ARVRAT: 69.3 ML/MIN
GLOBULIN SER-MCNC: 3.7 G/DL (ref 2.2–3.8)
GLUCOSE SERPL-MCNC: 151 MG/DL (ref 70–99)
HCT VFR BLD CALC: 40.2 % (ref 36–47)
HGB BLD-MCNC: 13.5 G/DL (ref 12–15.5)
LIPASE: 94 U/L (ref 73–393)
LYMPHOCYTES # BLD: 2.4 X10^3/UL (ref 1–4.8)
LYMPHOCYTES NFR BLD AUTO: 27 % (ref 24–48)
MCH RBC QN AUTO: 30 PG (ref 25–35)
MCHC RBC AUTO-ENTMCNC: 34 G/DL (ref 31–37)
MCV RBC AUTO: 89 FL (ref 79–100)
MONO #: 0.6 X10^3/UL (ref 0–1.1)
MONOCYTES NFR BLD: 7 % (ref 0–9)
NEUT #: 5.7 X10^3/UL (ref 1.8–7.7)
NEUTROPHILS NFR BLD AUTO: 62 % (ref 31–73)
NITRITE UR QL STRIP: NEGATIVE
PH UR STRIP: 5.5 [PH]
PLATELET # BLD AUTO: 237 X10^3/UL (ref 140–400)
POTASSIUM SERPL-SCNC: 3.9 MMOL/L (ref 3.5–5.1)
PROT SERPL-MCNC: 7.3 G/DL (ref 6.4–8.2)
PROT UR STRIP-MCNC: NEGATIVE MG/DL
RBC # BLD AUTO: 4.51 X10^6/UL (ref 3.5–5.4)
RBC #/AREA URNS HPF: (no result) /HPF (ref 0–2)
SODIUM SERPL-SCNC: 144 MMOL/L (ref 136–145)
SQUAMOUS #/AREA URNS LPF: (no result) /LPF
UROBILINOGEN UR-MCNC: 0.2 MG/DL
WBC # BLD AUTO: 9.1 X10^3/UL (ref 4–11)
WBC #/AREA URNS HPF: (no result) /HPF (ref 0–4)
YEAST #/AREA URNS HPF: PRESENT /HPF

## 2019-08-28 NOTE — PHYS DOC
Past Medical History


Past Medical History:  Anemia, Anxiety, Asthma, Depression, Hypertension, 

Seizure


Past Surgical History:  Other


Additional Past Surgical Histo:  MOUTH AND BACK, RIGHT KNEE


Alcohol Use:  None


Drug Use:  None





Adult General


Chief Complaint


Chief Complaint:  POST-OP PROBLEM





HPI


HPI





Patient is a 24-year-old female who is 2 days postop from old laparoscopic 

cholecystectomy. She been doing well since discharge. Tonight after having a 

bowel movement she felt some pain in her upper abdomen. She had some associated 

nausea. She has not had any fever chills or sweats. She's been able to tolerate 

food without much difficulty. She states she has been showering and her wounds 

have looked good. She states the pain is somewhat better than what it was 

shortly after the bowel movement tonight. She does state the pain is cramping in

nature and does not radiate anywhere and stays in her upper abdomen.[]





Review of Systems


Review of Systems





Constitutional: Denies fever or chills []


Eyes: Denies change in visual acuity, redness, or eye pain []


HENT: Denies nasal congestion or sore throat []


Respiratory: Denies cough or shortness of breath []


Cardiovascular: No additional information not addressed in HPI []


GI: Per history of present illness[]


: Denies dysuria or hematuria []


Musculoskeletal: Denies back pain or joint pain []


Integument: Denies rash or skin lesions []


Neurologic: Denies headache, focal weakness or sensory changes []


Endocrine: Denies polyuria or polydipsia []





All other systems were reviewed and found to be within normal limits, except as 

documented in this note.





Current Medications


Current Medications





Current Medications








 Medications


  (Trade)  Dose


 Ordered  Sig/Guille  Start Time


 Stop Time Status Last Admin


Dose Admin


 


 Fentanyl Citrate


  (Fentanyl 2ml


 Vial)  50 mcg  1X  ONCE  8/28/19 00:15


 8/28/19 00:16 DC 8/28/19 00:43


50 MCG


 


 Ondansetron HCl


  (Zofran)  4 mg  1X  ONCE  8/28/19 00:15


 8/28/19 00:16 DC 8/28/19 00:43


4 MG


 


 Sodium Chloride  1,000 ml @ 


 1,000 mls/hr  1X  ONCE  8/28/19 00:15


 8/28/19 01:14  8/28/19 00:43


1,000 MLS/HR











Allergies


Allergies





Allergies








Coded Allergies Type Severity Reaction Last Updated Verified


 


  Penicillins Allergy Severe  8/24/19 Yes











Physical Exam


Physical Exam





Constitutional: Well developed, well nourished, no acute distress, non-toxic 

appearance. []


HENT: Normocephalic, atraumatic, bilateral external ears normal, oropharynx 

moist, no oral exudates, nose normal. []


Eyes: PERRLA, EOMI, conjunctiva normal, no discharge. [] 


Neck: Normal range of motion, no tenderness, supple, no stridor. [] 


Cardiovascular:Heart rate regular rhythm, no murmur []


Lungs & Thorax:  Bilateral breath sounds clear to auscultation []


Abdomen: Bowel sounds normal, soft, no tenderness, no masses, no pulsatile 

masses. [] 


Skin: Warm, dry, no erythema, no rash. [] 


Back: No tenderness, no CVA tenderness. [] 


Extremities: No tenderness, no cyanosis, no clubbing, ROM intact, no edema. [] 


Neurologic: Alert and oriented X 3, normal motor function, normal sensory 

function, no focal deficits noted. []


Psychologic: Affect normal, judgement normal, mood normal. []





Current Patient Data


Vital Signs





                                   Vital Signs








  Date Time  Temp Pulse Resp B/P (MAP) Pulse Ox O2 Delivery O2 Flow Rate FiO2


 


8/28/19 00:43   18  96 Room Air  


 


8/28/19 00:07 97.9 89  162/92 (115)    





 97.9       








Lab Values





                                Laboratory Tests








Test


 8/28/19


00:13 8/28/19


00:20 8/28/19


00:21


 


White Blood Count


 9.1 x10^3/uL


(4.0-11.0) 


 





 


Red Blood Count


 4.51 x10^6/uL


(3.50-5.40) 


 





 


Hemoglobin


 13.5 g/dL


(12.0-15.5) 


 





 


Hematocrit


 40.2 %


(36.0-47.0) 


 





 


Mean Corpuscular Volume


 89 fL ()


 


 





 


Mean Corpuscular Hemoglobin 30 pg (25-35)    


 


Mean Corpuscular Hemoglobin


Concent 34 g/dL


(31-37) 


 





 


Red Cell Distribution Width


 14.1 %


(11.5-14.5) 


 





 


Platelet Count


 237 x10^3/uL


(140-400) 


 





 


Neutrophils (%) (Auto) 62 % (31-73)    


 


Lymphocytes (%) (Auto) 27 % (24-48)    


 


Monocytes (%) (Auto) 7 % (0-9)    


 


Eosinophils (%) (Auto) 4 % (0-3)  H  


 


Basophils (%) (Auto) 0 % (0-3)    


 


Neutrophils # (Auto)


 5.7 x10^3/uL


(1.8-7.7) 


 





 


Lymphocytes # (Auto)


 2.4 x10^3/uL


(1.0-4.8) 


 





 


Monocytes # (Auto)


 0.6 x10^3/uL


(0.0-1.1) 


 





 


Eosinophils # (Auto)


 0.3 x10^3/uL


(0.0-0.7) 


 





 


Basophils # (Auto)


 0.0 x10^3/uL


(0.0-0.2) 


 





 


Sodium Level


 144 mmol/L


(136-145) 


 





 


Potassium Level


 3.9 mmol/L


(3.5-5.1) 


 





 


Chloride Level


 107 mmol/L


() 


 





 


Carbon Dioxide Level


 26 mmol/L


(21-32) 


 





 


Anion Gap 11 (6-14)    


 


Blood Urea Nitrogen


 12 mg/dL


(7-20) 


 





 


Creatinine


 1.0 mg/dL


(0.6-1.0) 


 





 


Estimated GFR


(Cockcroft-Gault) 69.3  


 


 





 


BUN/Creatinine Ratio 12 (6-20)    


 


Glucose Level


 151 mg/dL


(70-99)  H 


 





 


Calcium Level


 8.9 mg/dL


(8.5-10.1) 


 





 


Total Bilirubin


 0.1 mg/dL


(0.2-1.0)  L 


 





 


Aspartate Amino Transferase


(AST) 13 U/L (15-37)


L 


 





 


Alanine Aminotransferase (ALT)


 22 U/L (14-59)


 


 





 


Alkaline Phosphatase


 62 U/L


() 


 





 


Total Protein


 7.3 g/dL


(6.4-8.2) 


 





 


Albumin


 3.6 g/dL


(3.4-5.0) 


 





 


Albumin/Globulin Ratio 1.0 (1.0-1.7)    


 


Lipase


 94 U/L


() 


 





 


Urine Collection Type  Unknown   


 


Urine Color  Yellow   


 


Urine Clarity  Cloudy   


 


Urine pH  5.5   


 


Urine Specific Gravity  1.020   


 


Urine Protein


 


 Negative mg/dL


(NEG-TRACE) 





 


Urine Glucose (UA)


 


 Negative mg/dL


(NEG) 





 


Urine Ketones (Stick)


 


 Negative mg/dL


(NEG) 





 


Urine Blood  Small (NEG)   


 


Urine Nitrite


 


 Negative (NEG)


 





 


Urine Bilirubin


 


 Negative (NEG)


 





 


Urine Urobilinogen Dipstick


 


 0.2 mg/dL (0.2


mg/dL) 





 


Urine Leukocyte Esterase  Large (NEG)   


 


Urine RBC


 


 6-10 /HPF


(0-2) 





 


Urine WBC


 


 Tntc /HPF


(0-4) 





 


Urine Squamous Epithelial


Cells 


 Many /LPF  


 





 


Urine Amorphous Sediment  Present /HPF   


 


Urine Bacteria


 


 Few /HPF


(0-FEW) 





 


Urine Mucus  Marked /LPF   


 


Urine Yeast  Present /HPF   


 


POC Urine HCG, Qualitative


 


 


 Hcg negative


(Negative)





                                Laboratory Tests


8/28/19 00:13








                                Laboratory Tests


8/28/19 00:13














EKG


EKG


[]





Radiology/Procedures


Radiology/Procedures


[]





Course & Med Decision Making


Course & Med Decision Making


Pertinent Labs and Imaging studies reviewed. (See chart for details)





[ED course: Evaluation reveals a 24-year-old female that is a couple days postop

 laparoscopic cholecystectomy. Her wounds looked terrific. Her abdominal exam is

 actually very benign. She did not seem to be in any distress here. She was 

given some IV fluids and pain medicine during her stay here in the emergency 

department which did she did state that her feel better.]





Dragon Disclaimer


Dragon Disclaimer


This electronic medical record was generated, in whole or in part, using a voice

recognition dictation system.





Departure


Departure


Impression:  


   Primary Impression:  


   Postoperative abdominal pain


   Additional Impression:  


   UTI (urinary tract infection)


Disposition:  01 HOME, SELF-CARE


Condition:  STABLE


Referrals:  


NO PCP (PCP)


Patient Instructions:  Abdominal Pain, Laparoscopic Cholecystectomy, Care After





Additional Instructions:  


Follow with your surgeon as scheduled. Return to the emergency department with 

any new or concerning symptoms


Scripts


Ondansetron Hcl (ZOFRAN) 4 Mg Tablet


1 TAB PO Q8HRS PRN for NAUSEA, #20 TAB


   Prov: KULDEEP CUEVAS DO         8/28/19 


Sulfamethoxazole/Trimethoprim (BACTRIM DS TABLET) 1 Each Tablet


1 TAB PO BID, #14 TAB


   Prov: KULDEEP CUEVAS DO         8/28/19





Problem Qualifiers











KULDEEP CUEVAS DO             Aug 28, 2019 00:53

## 2019-11-11 ENCOUNTER — HOSPITAL ENCOUNTER (EMERGENCY)
Dept: HOSPITAL 61 - ER | Age: 24
Discharge: HOME | End: 2019-11-11
Payer: SELF-PAY

## 2019-11-11 VITALS — SYSTOLIC BLOOD PRESSURE: 117 MMHG | DIASTOLIC BLOOD PRESSURE: 65 MMHG

## 2019-11-11 VITALS — HEIGHT: 70 IN | WEIGHT: 222 LBS | BODY MASS INDEX: 31.78 KG/M2

## 2019-11-11 DIAGNOSIS — R11.2: ICD-10-CM

## 2019-11-11 DIAGNOSIS — R56.9: ICD-10-CM

## 2019-11-11 DIAGNOSIS — R05: Primary | ICD-10-CM

## 2019-11-11 DIAGNOSIS — F32.9: ICD-10-CM

## 2019-11-11 DIAGNOSIS — I10: ICD-10-CM

## 2019-11-11 DIAGNOSIS — J45.909: ICD-10-CM

## 2019-11-11 DIAGNOSIS — F41.9: ICD-10-CM

## 2019-11-11 PROCEDURE — 99281 EMR DPT VST MAYX REQ PHY/QHP: CPT

## 2019-11-12 NOTE — PHYS DOC
Past Medical History


Past Medical History:  Anemia, Anxiety, Asthma, Depression, Hypertension, 

Seizure


Additional Past Medical Histor:  "low blood sugars" "brain tumors" pcos


 (AGNES LIZ)


Past Surgical History:  Other


Additional Past Surgical Histo:  MOUTH AND BACK, RIGHT KNEE


 (AGNES LIZ)


Alcohol Use:  None


Drug Use:  None


 (AGNES LIZ)





Adult General


Chief Complaint


Chief Complaint:  COUGH





HPI


HPI





Patient is a 24  year old female, accompanied by her significant other, who 

presents to the emergency Department today with complaints of a cough for the 

last week. Patient states that the cough is nonproductive. She states that she 

has had nausea and vomiting after coughing several times throughout the week. 

She denies any fever, shortness of breath, wheezing, abdominal pain, or 

diarrhea. Patient states that both of her sides hurt with coughing. She has 

tried taking over-the-counter DayQuil and NyQuil with little relief of her 

symptoms. Patient is still able to smoke cigarettes just not as many cigarettes 

as she usually does. She currently rates her discomfort a 4 out of 10 on the 

pain scale. She denies any alleviating factors, the pain increases with 

coughing. Patient states she has a history of asthma, seizures, anemia, and was 

told that she had tumors in her brain before but never followed up on it.


 (AGNES LIZ)





Review of Systems


Review of Systems





Constitutional: Denies fever or chills []


Eyes: Denies  redness, or eye pain []


HENT: Denies nasal congestion or sore throat []


Respiratory: See history of present illness


Cardiovascular: No additional information not addressed in HPI []


GI: Denies abdominal pain,  or diarrhea; see history of present illness


: Denies dysuria or hematuria []


Musculoskeletal: Denies back pain or joint pain []


Integument: Denies rash or skin lesions []


Neurologic: Denies headache





Complete systems were reviewed and found to be within normal limits, except as 

documented in this note.


 (AGNES LIZ)





Allergies


Allergies





Allergies








Coded Allergies Type Severity Reaction Last Updated Verified


 


  Penicillins Allergy Severe  8/24/19 Yes





 (SVETLANA PORTILLO MD)





Physical Exam


Physical Exam





Constitutional: Well developed, well nourished, no acute distress, non-toxic 

appearance. []


HENT: Normocephalic, atraumatic, bilateral external ears normal, bilateral TMs 

normal, posterior pharynx normal, oropharynx moist, no oral exudates, nose 

normal. []


Eyes: PERRLA, EOMI, conjunctiva normal, no discharge. [] 


Neck: Normal range of motion, no tenderness, supple, no stridor. [] 


Cardiovascular:Heart rate regular rhythm, no murmur []


Lungs & Thorax:  Bilateral breath sounds clear to auscultation, Respirations 

even and unlabored, no retractions, no respiratory distress


Skin: Warm, dry, no erythema, no rash. [] 


Back: No tenderness


Extremities: No cyanosis, no clubbing, ROM intact, no edema. [] 


Neurologic: Alert and oriented X 3, no focal deficits noted. []


Psychologic: Affect normal, judgement normal, mood normal. []


 (AGNES LIZ)





Current Patient Data


Vital Signs





                                   Vital Signs








  Date Time  Temp Pulse Resp B/P (MAP) Pulse Ox O2 Delivery O2 Flow Rate FiO2


 


11/11/19 23:00 98.1 99 22 117/65 (82) 99 Room Air  





 98.1       





 (SVETLANA PORTILLO MD)





EKG


EKG


[]


 (AGNES LIZ)





Radiology/Procedures


Radiology/Procedures


[]


 (AGNES LIZ)





Course & Med Decision Making


Course & Med Decision Making


Pertinent Labs and Imaging studies reviewed. (See chart for details)


dx: medical screening exam 


A medical screening exam was performed, patient was found to have no emergent 

medical condition. The plan of care would've included prescriptions for an 

albuterol inhaler, and Tessalon Perles also URI instructions. However, the 

patient eloped after talking with registration.


[]


[]


 (AGNES LIZ)


Course & Med Decision Making





Staff Physician Addendum:


I was working in the ER during the course of this patient's visit.  I was 

available for consultation as needed, but I was not directly involved in the 

care of this patient.    


 (SVETLANA PORTILLO MD)


Dragon Disclaimer


Dragon Disclaimer


This electronic medical record was generated, in whole or in part, using a voice

 recognition dictation system.


 (AGNES LIZ)





Departure


Departure


Impression:  


   Primary Impression:  


   Encounter for medical screening examination


Disposition:  01 HOME, SELF-CARE (eloped after speaking with registrar)


Condition:  STABLE


Referrals:  


NO PCP (PCP)











AGNES LIZ       Nov 12, 2019 00:00


SVETLANA PORTILLO MD            Nov 12, 2019 04:40